# Patient Record
Sex: FEMALE | Race: WHITE | Employment: UNEMPLOYED | URBAN - METROPOLITAN AREA
[De-identification: names, ages, dates, MRNs, and addresses within clinical notes are randomized per-mention and may not be internally consistent; named-entity substitution may affect disease eponyms.]

---
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
## 2022-04-11 PROBLEM — F98.8 ADD (ATTENTION DEFICIT DISORDER) WITHOUT HYPERACTIVITY: Status: ACTIVE | Noted: 2022-04-11

## 2022-12-16 ENCOUNTER — HOSPITAL ENCOUNTER (OUTPATIENT)
Age: 22
Setting detail: SPECIMEN
Discharge: HOME | End: 2022-12-16

## 2022-12-16 DIAGNOSIS — Z00.00 WELL ADULT EXAM: ICD-10-CM

## 2022-12-17 LAB
BILIRUBIN URINE: NEGATIVE
COLOR: YELLOW
COMMENT UA: NORMAL
GLUCOSE URINE: NEGATIVE
KETONES, URINE: NEGATIVE
LEUKOCYTE ESTERASE, URINE: NEGATIVE
NITRITE, URINE: NEGATIVE
PH UA: 7.5 (ref 5–8)
PROTEIN UA: NEGATIVE
SPECIFIC GRAVITY UA: 1.01 (ref 1–1.03)
TURBIDITY: CLEAR
URINE HGB: NEGATIVE
UROBILINOGEN, URINE: NORMAL

## 2022-12-18 LAB
C. TRACHOMATIS DNA ,URINE: NEGATIVE
N. GONORRHOEAE DNA, URINE: NEGATIVE
SPECIMEN DESCRIPTION: NORMAL

## 2024-09-09 RX ORDER — ESCITALOPRAM OXALATE 5 MG/1
5 TABLET ORAL DAILY
COMMUNITY
Start: 2023-11-01

## 2024-09-16 ENCOUNTER — HOSPITAL ENCOUNTER (OUTPATIENT)
Age: 24
Setting detail: SPECIMEN
Discharge: HOME | End: 2024-09-16

## 2024-09-16 ENCOUNTER — OFFICE VISIT (OUTPATIENT)
Age: 24
End: 2024-09-16
Payer: COMMERCIAL

## 2024-09-16 VITALS
HEART RATE: 102 BPM | HEIGHT: 67 IN | OXYGEN SATURATION: 100 % | SYSTOLIC BLOOD PRESSURE: 117 MMHG | WEIGHT: 122 LBS | DIASTOLIC BLOOD PRESSURE: 70 MMHG | BODY MASS INDEX: 19.15 KG/M2

## 2024-09-16 DIAGNOSIS — N39.0 RECURRENT UTI: Primary | ICD-10-CM

## 2024-09-16 DIAGNOSIS — R31.29 OTHER MICROSCOPIC HEMATURIA: ICD-10-CM

## 2024-09-16 DIAGNOSIS — N92.6 IRREGULAR MENSES: ICD-10-CM

## 2024-09-16 DIAGNOSIS — R39.15 URGENCY OF URINATION: ICD-10-CM

## 2024-09-16 DIAGNOSIS — R35.0 URINARY FREQUENCY: ICD-10-CM

## 2024-09-16 DIAGNOSIS — K59.00 CONSTIPATION, UNSPECIFIED CONSTIPATION TYPE: ICD-10-CM

## 2024-09-16 DIAGNOSIS — R39.89 BLADDER PAIN: ICD-10-CM

## 2024-09-16 DIAGNOSIS — N94.10 DYSPAREUNIA, FEMALE: ICD-10-CM

## 2024-09-16 LAB
BILIRUBIN, POC: NEGATIVE
BLOOD URINE, POC: NEGATIVE
CLARITY, POC: CLEAR
COLOR, POC: YELLOW
GLUCOSE URINE, POC: NEGATIVE MG/DL
KETONES, POC: NEGATIVE MG/DL
LEUKOCYTE EST, POC: NEGATIVE
NITRITE, POC: NEGATIVE
PH, POC: 6
PROTEIN, POC: NEGATIVE MG/DL
SPECIFIC GRAVITY, POC: 1.01
UROBILINOGEN, POC: NEGATIVE MG/DL

## 2024-09-16 PROCEDURE — 99203 OFFICE O/P NEW LOW 30 MIN: CPT

## 2024-09-16 PROCEDURE — 81002 URINALYSIS NONAUTO W/O SCOPE: CPT

## 2024-09-17 LAB
CASE NUMBER:: NORMAL
SPECIMEN DESCRIPTION: NORMAL
SURGICAL PATHOLOGY REPORT: NORMAL

## 2024-10-16 NOTE — PROGRESS NOTES
DAY OF SURGERY/PROCEDURE  GUIDELINES    As a patient at the [facility], you can expect quality medical and nursing care that is centered on your individual needs. It is our goal to make your surgical experience as comfortable and excellent as possible.  ________________________________________________________________________    The following instructions are general guidelines, if any information on this sheet is different from what your doctor has instructed you to do, please follow your doctor's instructions.    Please arrive on 10/21 @ 700 am       Enter through entrance C. Check in at registration     Upon arrival you will be taken to the pre-operative area to get ready for surgery, your family will stay in the waiting room and visit with you once you are ready for surgery. Due to special limitations please limit visitation to 1-2 members of your family at a time. When it is time for surgery your family will return to the waiting room.    Nothing to eat, drink, smoke, suck or chew after midnight (no water, gum, mints, cigarettes, cigars, pipes, snuff, chewing tobacco, etc.) or your surgery may be canceled.     Take a shower or bath on the morning of your surgery/procedure (Hibiclens if directed) Do not apply any lotions.    Brush your teeth, but do not swallow any water    IN CASE OF ILLNESS - If you have a cold or flu symptoms (high fever, runny nose, sore throat, cough, etc.) rash, nausea, vomiting, loose stools, and/or recent contact with someone who has a contagious disease (chick pox, measles, etc.) please call your doctor before coming to the surgery center    DO NOT take anticoagulants (blood thinners, aspirin or aspirin-containing products) as instructed by your physician.    Leave all jewelry at home and wear loose, comfortable clothing that is easy to put on and take off.     If you will be returning home the same day as your surgery, you will need to have a responsible adult (18

## 2024-10-18 ENCOUNTER — ANESTHESIA EVENT (OUTPATIENT)
Dept: OPERATING ROOM | Age: 24
End: 2024-10-18
Payer: COMMERCIAL

## 2024-10-21 ENCOUNTER — ANESTHESIA (OUTPATIENT)
Dept: OPERATING ROOM | Age: 24
End: 2024-10-21
Payer: COMMERCIAL

## 2024-10-21 ENCOUNTER — HOSPITAL ENCOUNTER (OUTPATIENT)
Age: 24
Setting detail: OUTPATIENT SURGERY
Discharge: HOME | End: 2024-10-21
Attending: OBSTETRICS & GYNECOLOGY | Admitting: OBSTETRICS & GYNECOLOGY
Payer: COMMERCIAL

## 2024-10-21 VITALS
TEMPERATURE: 97.3 F | WEIGHT: 120 LBS | RESPIRATION RATE: 13 BRPM | DIASTOLIC BLOOD PRESSURE: 65 MMHG | BODY MASS INDEX: 18.19 KG/M2 | OXYGEN SATURATION: 100 % | HEIGHT: 68 IN | HEART RATE: 70 BPM | SYSTOLIC BLOOD PRESSURE: 107 MMHG

## 2024-10-21 LAB — HCG, PREGNANCY URINE (POC): NEGATIVE

## 2024-10-21 PROCEDURE — 6370000000 HC RX 637 (ALT 250 FOR IP)

## 2024-10-21 PROCEDURE — 6360000002 HC RX W HCPCS: Performed by: OBSTETRICS & GYNECOLOGY

## 2024-10-21 PROCEDURE — 2500000003 HC RX 250 WO HCPCS: Performed by: OBSTETRICS & GYNECOLOGY

## 2024-10-21 PROCEDURE — 3700000000 HC ANESTHESIA ATTENDED CARE: Performed by: OBSTETRICS & GYNECOLOGY

## 2024-10-21 PROCEDURE — 81025 URINE PREGNANCY TEST: CPT

## 2024-10-21 PROCEDURE — 7100000011 HC PHASE II RECOVERY - ADDTL 15 MIN: Performed by: OBSTETRICS & GYNECOLOGY

## 2024-10-21 PROCEDURE — 3700000001 HC ADD 15 MINUTES (ANESTHESIA): Performed by: OBSTETRICS & GYNECOLOGY

## 2024-10-21 PROCEDURE — 52260 CYSTOSCOPY AND TREATMENT: CPT | Performed by: OBSTETRICS & GYNECOLOGY

## 2024-10-21 PROCEDURE — 2500000003 HC RX 250 WO HCPCS: Performed by: SPECIALIST

## 2024-10-21 PROCEDURE — 51700 IRRIGATION OF BLADDER: CPT | Performed by: OBSTETRICS & GYNECOLOGY

## 2024-10-21 PROCEDURE — 6360000002 HC RX W HCPCS: Performed by: SPECIALIST

## 2024-10-21 PROCEDURE — 6360000002 HC RX W HCPCS

## 2024-10-21 PROCEDURE — 7100000010 HC PHASE II RECOVERY - FIRST 15 MIN: Performed by: OBSTETRICS & GYNECOLOGY

## 2024-10-21 PROCEDURE — 3600000002 HC SURGERY LEVEL 2 BASE: Performed by: OBSTETRICS & GYNECOLOGY

## 2024-10-21 PROCEDURE — 2580000003 HC RX 258: Performed by: STUDENT IN AN ORGANIZED HEALTH CARE EDUCATION/TRAINING PROGRAM

## 2024-10-21 PROCEDURE — 2709999900 HC NON-CHARGEABLE SUPPLY: Performed by: OBSTETRICS & GYNECOLOGY

## 2024-10-21 PROCEDURE — 3600000012 HC SURGERY LEVEL 2 ADDTL 15MIN: Performed by: OBSTETRICS & GYNECOLOGY

## 2024-10-21 RX ORDER — LIDOCAINE HYDROCHLORIDE 10 MG/ML
INJECTION, SOLUTION EPIDURAL; INFILTRATION; INTRACAUDAL; PERINEURAL
Status: DISCONTINUED | OUTPATIENT
Start: 2024-10-21 | End: 2024-10-21 | Stop reason: SDUPTHER

## 2024-10-21 RX ORDER — DEXAMETHASONE SODIUM PHOSPHATE 10 MG/ML
INJECTION, SOLUTION INTRAMUSCULAR; INTRAVENOUS
Status: DISCONTINUED | OUTPATIENT
Start: 2024-10-21 | End: 2024-10-21 | Stop reason: SDUPTHER

## 2024-10-21 RX ORDER — NALOXONE HYDROCHLORIDE 0.4 MG/ML
INJECTION, SOLUTION INTRAMUSCULAR; INTRAVENOUS; SUBCUTANEOUS PRN
Status: DISCONTINUED | OUTPATIENT
Start: 2024-10-21 | End: 2024-10-21 | Stop reason: HOSPADM

## 2024-10-21 RX ORDER — MORPHINE SULFATE 2 MG/ML
2 INJECTION, SOLUTION INTRAMUSCULAR; INTRAVENOUS EVERY 5 MIN PRN
Status: DISCONTINUED | OUTPATIENT
Start: 2024-10-21 | End: 2024-10-21 | Stop reason: HOSPADM

## 2024-10-21 RX ORDER — LABETALOL HYDROCHLORIDE 5 MG/ML
10 INJECTION, SOLUTION INTRAVENOUS
Status: DISCONTINUED | OUTPATIENT
Start: 2024-10-21 | End: 2024-10-21 | Stop reason: HOSPADM

## 2024-10-21 RX ORDER — ACETAMINOPHEN 500 MG
1000 TABLET ORAL EVERY 6 HOURS PRN
Qty: 120 TABLET | Refills: 0 | Status: SHIPPED | OUTPATIENT
Start: 2024-10-21 | End: 2024-11-20

## 2024-10-21 RX ORDER — SODIUM CHLORIDE 9 MG/ML
INJECTION, SOLUTION INTRAVENOUS PRN
Status: DISCONTINUED | OUTPATIENT
Start: 2024-10-21 | End: 2024-10-21 | Stop reason: HOSPADM

## 2024-10-21 RX ORDER — KETOROLAC TROMETHAMINE 30 MG/ML
INJECTION, SOLUTION INTRAMUSCULAR; INTRAVENOUS
Status: DISCONTINUED | OUTPATIENT
Start: 2024-10-21 | End: 2024-10-21 | Stop reason: SDUPTHER

## 2024-10-21 RX ORDER — ONDANSETRON 2 MG/ML
4 INJECTION INTRAMUSCULAR; INTRAVENOUS
Status: DISCONTINUED | OUTPATIENT
Start: 2024-10-21 | End: 2024-10-21 | Stop reason: HOSPADM

## 2024-10-21 RX ORDER — MIDAZOLAM HYDROCHLORIDE 2 MG/2ML
2 INJECTION, SOLUTION INTRAMUSCULAR; INTRAVENOUS
Status: DISCONTINUED | OUTPATIENT
Start: 2024-10-21 | End: 2024-10-21 | Stop reason: HOSPADM

## 2024-10-21 RX ORDER — HYDRALAZINE HYDROCHLORIDE 20 MG/ML
10 INJECTION INTRAMUSCULAR; INTRAVENOUS
Status: DISCONTINUED | OUTPATIENT
Start: 2024-10-21 | End: 2024-10-21 | Stop reason: HOSPADM

## 2024-10-21 RX ORDER — FLUCONAZOLE 150 MG/1
150 TABLET ORAL
Qty: 3 TABLET | Refills: 0 | Status: SHIPPED | OUTPATIENT
Start: 2024-10-21 | End: 2024-10-30

## 2024-10-21 RX ORDER — IPRATROPIUM BROMIDE AND ALBUTEROL SULFATE 2.5; .5 MG/3ML; MG/3ML
1 SOLUTION RESPIRATORY (INHALATION)
Status: DISCONTINUED | OUTPATIENT
Start: 2024-10-21 | End: 2024-10-21 | Stop reason: HOSPADM

## 2024-10-21 RX ORDER — SODIUM CHLORIDE, SODIUM LACTATE, POTASSIUM CHLORIDE, CALCIUM CHLORIDE 600; 310; 30; 20 MG/100ML; MG/100ML; MG/100ML; MG/100ML
INJECTION, SOLUTION INTRAVENOUS CONTINUOUS
Status: DISCONTINUED | OUTPATIENT
Start: 2024-10-21 | End: 2024-10-21 | Stop reason: HOSPADM

## 2024-10-21 RX ORDER — OXYCODONE AND ACETAMINOPHEN 5; 325 MG/1; MG/1
2 TABLET ORAL
Status: DISCONTINUED | OUTPATIENT
Start: 2024-10-21 | End: 2024-10-21 | Stop reason: HOSPADM

## 2024-10-21 RX ORDER — SODIUM CHLORIDE 0.9 % (FLUSH) 0.9 %
5-40 SYRINGE (ML) INJECTION PRN
Status: DISCONTINUED | OUTPATIENT
Start: 2024-10-21 | End: 2024-10-21 | Stop reason: HOSPADM

## 2024-10-21 RX ORDER — PROPOFOL 10 MG/ML
INJECTION, EMULSION INTRAVENOUS
Status: DISCONTINUED | OUTPATIENT
Start: 2024-10-21 | End: 2024-10-21 | Stop reason: SDUPTHER

## 2024-10-21 RX ORDER — ONDANSETRON 2 MG/ML
INJECTION INTRAMUSCULAR; INTRAVENOUS
Status: DISCONTINUED | OUTPATIENT
Start: 2024-10-21 | End: 2024-10-21 | Stop reason: SDUPTHER

## 2024-10-21 RX ORDER — LIDOCAINE HYDROCHLORIDE 10 MG/ML
INJECTION, SOLUTION INFILTRATION; PERINEURAL
Status: DISCONTINUED
Start: 2024-10-21 | End: 2024-10-21 | Stop reason: HOSPADM

## 2024-10-21 RX ORDER — FENTANYL CITRATE 50 UG/ML
INJECTION, SOLUTION INTRAMUSCULAR; INTRAVENOUS
Status: DISCONTINUED | OUTPATIENT
Start: 2024-10-21 | End: 2024-10-21 | Stop reason: SDUPTHER

## 2024-10-21 RX ORDER — GLYCOPYRROLATE 0.2 MG/ML
0.4 INJECTION INTRAMUSCULAR; INTRAVENOUS ONCE
Status: DISCONTINUED | OUTPATIENT
Start: 2024-10-21 | End: 2024-10-21 | Stop reason: HOSPADM

## 2024-10-21 RX ORDER — TRIAMCINOLONE ACETONIDE 40 MG/ML
INJECTION, SUSPENSION INTRA-ARTICULAR; INTRAMUSCULAR
Status: DISCONTINUED
Start: 2024-10-21 | End: 2024-10-21 | Stop reason: HOSPADM

## 2024-10-21 RX ORDER — CIPROFLOXACIN 500 MG/1
500 TABLET, FILM COATED ORAL 2 TIMES DAILY
Qty: 20 TABLET | Refills: 0 | Status: SHIPPED | OUTPATIENT
Start: 2024-10-21 | End: 2024-10-31

## 2024-10-21 RX ORDER — PHENAZOPYRIDINE HYDROCHLORIDE 100 MG/1
TABLET, FILM COATED ORAL
Status: COMPLETED
Start: 2024-10-21 | End: 2024-10-21

## 2024-10-21 RX ORDER — SODIUM CHLORIDE 0.9 % (FLUSH) 0.9 %
5-40 SYRINGE (ML) INJECTION EVERY 12 HOURS SCHEDULED
Status: DISCONTINUED | OUTPATIENT
Start: 2024-10-21 | End: 2024-10-21 | Stop reason: HOSPADM

## 2024-10-21 RX ORDER — PHENAZOPYRIDINE HYDROCHLORIDE 100 MG/1
200 TABLET, FILM COATED ORAL ONCE
Status: COMPLETED | OUTPATIENT
Start: 2024-10-21 | End: 2024-10-21

## 2024-10-21 RX ORDER — METOCLOPRAMIDE HYDROCHLORIDE 5 MG/ML
10 INJECTION INTRAMUSCULAR; INTRAVENOUS
Status: DISCONTINUED | OUTPATIENT
Start: 2024-10-21 | End: 2024-10-21 | Stop reason: HOSPADM

## 2024-10-21 RX ORDER — MIDAZOLAM HYDROCHLORIDE 1 MG/ML
INJECTION, SOLUTION INTRAMUSCULAR; INTRAVENOUS
Status: DISCONTINUED | OUTPATIENT
Start: 2024-10-21 | End: 2024-10-21 | Stop reason: SDUPTHER

## 2024-10-21 RX ORDER — HEPARIN SODIUM 5000 [USP'U]/ML
INJECTION, SOLUTION INTRAVENOUS; SUBCUTANEOUS
Status: DISCONTINUED
Start: 2024-10-21 | End: 2024-10-21 | Stop reason: HOSPADM

## 2024-10-21 RX ORDER — PHENAZOPYRIDINE HYDROCHLORIDE 200 MG/1
200 TABLET, FILM COATED ORAL 3 TIMES DAILY
Qty: 6 TABLET | Refills: 0 | Status: SHIPPED | OUTPATIENT
Start: 2024-10-21 | End: 2024-10-23

## 2024-10-21 RX ORDER — LIDOCAINE HYDROCHLORIDE 10 MG/ML
1 INJECTION, SOLUTION EPIDURAL; INFILTRATION; INTRACAUDAL; PERINEURAL
Status: DISCONTINUED | OUTPATIENT
Start: 2024-10-21 | End: 2024-10-21 | Stop reason: HOSPADM

## 2024-10-21 RX ORDER — OXYCODONE AND ACETAMINOPHEN 5; 325 MG/1; MG/1
1 TABLET ORAL
Status: DISCONTINUED | OUTPATIENT
Start: 2024-10-21 | End: 2024-10-21 | Stop reason: HOSPADM

## 2024-10-21 RX ORDER — MEPERIDINE HYDROCHLORIDE 50 MG/ML
12.5 INJECTION INTRAMUSCULAR; INTRAVENOUS; SUBCUTANEOUS EVERY 5 MIN PRN
Status: DISCONTINUED | OUTPATIENT
Start: 2024-10-21 | End: 2024-10-21 | Stop reason: HOSPADM

## 2024-10-21 RX ORDER — PHENYLEPHRINE HCL IN 0.9% NACL 1 MG/10 ML
SYRINGE (ML) INTRAVENOUS
Status: DISCONTINUED | OUTPATIENT
Start: 2024-10-21 | End: 2024-10-21 | Stop reason: SDUPTHER

## 2024-10-21 RX ORDER — IBUPROFEN 600 MG/1
600 TABLET, FILM COATED ORAL EVERY 6 HOURS PRN
Qty: 120 TABLET | Refills: 0 | Status: SHIPPED | OUTPATIENT
Start: 2024-10-21 | End: 2024-11-20

## 2024-10-21 RX ORDER — DIPHENHYDRAMINE HYDROCHLORIDE 50 MG/ML
12.5 INJECTION INTRAMUSCULAR; INTRAVENOUS
Status: DISCONTINUED | OUTPATIENT
Start: 2024-10-21 | End: 2024-10-21 | Stop reason: HOSPADM

## 2024-10-21 RX ADMIN — PHENAZOPYRIDINE HYDROCHLORIDE 200 MG: 100 TABLET, FILM COATED ORAL at 09:59

## 2024-10-21 RX ADMIN — MIDAZOLAM 2 MG: 1 INJECTION INTRAMUSCULAR; INTRAVENOUS at 08:23

## 2024-10-21 RX ADMIN — ONDANSETRON 4 MG: 2 INJECTION INTRAMUSCULAR; INTRAVENOUS at 08:29

## 2024-10-21 RX ADMIN — FENTANYL CITRATE 25 MCG: 50 INJECTION, SOLUTION INTRAMUSCULAR; INTRAVENOUS at 08:30

## 2024-10-21 RX ADMIN — FENTANYL CITRATE 50 MCG: 50 INJECTION, SOLUTION INTRAMUSCULAR; INTRAVENOUS at 08:26

## 2024-10-21 RX ADMIN — PROPOFOL 30 MG: 10 INJECTION, EMULSION INTRAVENOUS at 08:30

## 2024-10-21 RX ADMIN — FENTANYL CITRATE 25 MCG: 50 INJECTION, SOLUTION INTRAMUSCULAR; INTRAVENOUS at 08:37

## 2024-10-21 RX ADMIN — Medication 100 MCG: at 08:54

## 2024-10-21 RX ADMIN — PROPOFOL 50 MG: 10 INJECTION, EMULSION INTRAVENOUS at 08:42

## 2024-10-21 RX ADMIN — SODIUM CHLORIDE, PRESERVATIVE FREE 10 ML: 5 INJECTION INTRAVENOUS at 08:26

## 2024-10-21 RX ADMIN — LIDOCAINE HYDROCHLORIDE 50 MG: 10 INJECTION, SOLUTION EPIDURAL; INFILTRATION; INTRACAUDAL; PERINEURAL at 08:26

## 2024-10-21 RX ADMIN — KETOROLAC TROMETHAMINE 30 MG: 30 INJECTION, SOLUTION INTRAMUSCULAR at 08:56

## 2024-10-21 RX ADMIN — Medication 2000 MG: at 08:30

## 2024-10-21 RX ADMIN — PROPOFOL 50 MG: 10 INJECTION, EMULSION INTRAVENOUS at 08:37

## 2024-10-21 RX ADMIN — DEXAMETHASONE SODIUM PHOSPHATE 10 MG: 10 INJECTION, SOLUTION INTRAMUSCULAR; INTRAVENOUS at 08:29

## 2024-10-21 RX ADMIN — PROPOFOL 70 MG: 10 INJECTION, EMULSION INTRAVENOUS at 08:26

## 2024-10-21 RX ADMIN — PROPOFOL 100 MCG/KG/MIN: 10 INJECTION, EMULSION INTRAVENOUS at 08:43

## 2024-10-21 RX ADMIN — SODIUM CHLORIDE, PRESERVATIVE FREE 10 ML: 5 INJECTION INTRAVENOUS at 09:07

## 2024-10-21 RX ADMIN — PHENAZOPYRIDINE HYDROCHLORIDE 200 MG: 100 TABLET ORAL at 09:59

## 2024-10-21 ASSESSMENT — PAIN SCALES - GENERAL: PAINLEVEL_OUTOF10: 3

## 2024-10-21 ASSESSMENT — PAIN DESCRIPTION - DESCRIPTORS: DESCRIPTORS: BURNING

## 2024-10-21 ASSESSMENT — PAIN - FUNCTIONAL ASSESSMENT
PAIN_FUNCTIONAL_ASSESSMENT: 0-10
PAIN_FUNCTIONAL_ASSESSMENT: 0-10
PAIN_FUNCTIONAL_ASSESSMENT: NONE - DENIES PAIN

## 2024-10-21 ASSESSMENT — PAIN DESCRIPTION - LOCATION: LOCATION: VAGINA

## 2024-10-21 NOTE — DISCHARGE INSTRUCTIONS
[facility] UROGYNECOLOGY & PELVIC REHABILITATION     POSTOPERATIVE PATIENT INSTRUCTIONS  MAJOR & MINOR SURGICAL PROCEDURES      Congratulations! You have taken the brave step of undergoing surgery in order to try to improve your health.  Now it is time to focus on healing outside of the hospital / surgery center setting.  To make your dismissal as successful as possible, it is recommended that you thoroughly review these postoperative instructions. These instructions pertain to, but are not limited to the following procedures:      MAJOR   Hysterectomy - Vaginal / Laparoscopic / Robotic   With or Without tube-ovarian Removal (Salpingo-oophorectomy)   Sacrocolpopexy / Sacroperineopexy / Sacrocervicopexy/ Hysteropexy (lifting apex to sacrum)  Major Vaginal Prolapse repairs   Cystocele repair (Anterior Colporrhaphy)   Rectocele repair (Posterior Colporrhaphy)   Enterocele repair    Vaginal vault repair   Closing or removal of the vagina (Colpocleisis / Colpectomy)   Major urinary incontinence surgery (Carrera Urethropexy, MMK)   Major fibroid removal (Myomectomy)   Major tubal surgery (re-anastomosis, ectopic pregnancy, pelvic inflammatory disease)   Major surgery for adhesions or endometriosis involving bowel   Major vaginal mesh removal    Fistula repair of the bladder or colorectum to the vagina   Creation of a new vagina (Neovaginoplasty) or repair of a Mullerian Anomaly   Other       MINOR   Hysteroscopy with Dilatation / Curettage, Endometrial Ablation / Hysterosalpingogram (HSG)  Laparoscopy With or Without minor tubal or ovarian surgery   Minor Vaginal Prolapse repairs   Cystocele repair (Anterior Colporrhaphy)   Rectocele repair (Posterior Colporrhaphy)   Urethrocele repair    Minor urinary incontinence surgery (Slings, Transurethral Bulking)   Minor vaginal surgery (Mesh removal, Laser ablation, Biopsies, Labial revisions, Injections)    Minor surgery of the bladder (DMSO, Hydrodistention, Botox, etc.)

## 2024-10-21 NOTE — DISCHARGE SUMMARY
Urogynecology Discharge Summary  [facility]      Patient Name: Korin Christie  Patient : 2000  Primary Care Physician: Gabriela Higgins MD  Admit Date: 10/21/2024    Principal Diagnosis: suspected interstitial cystitis     Other Diagnosis:   Bladder pain [R39.89]  Patient Active Problem List   Diagnosis    Asthma    Delta storage pool disease (HCC)    Dysmenorrhea    Cellulitis of ankle    COVID-19    History of 2019 novel coronavirus disease (COVID-19)    ADD (attention deficit disorder) without hyperactivity     Infection: No  Hospital Acquired: No    Surgical Operations & Procedures: Cystoscopy with DMSO    Consultations: Anesthesia    Pertinent Findings & Procedures:   Korin Christie is a 24 y.o. female admitted for surgical evaluation of suspected interstitial cystitis; received Ancef preop.  She underwent Cystoscopy with DMSO on 10/21/24. She was discharged home without a fallon catheter. Post-op course normal, discharged home on POD#0.  Follow up in 1 week. Discharge instructions reviewed and questions answered.    Course of patient: normal    Discharge to: Home    Readmission planned: No    Recommendations on Discharge:     Medications:     Medication List        START taking these medications      acetaminophen 500 MG tablet  Commonly known as: TYLENOL  Take 2 tablets by mouth every 6 hours as needed for Pain     ciprofloxacin 500 MG tablet  Commonly known as: CIPRO  Take 1 tablet by mouth 2 times daily for 10 days     fluconazole 150 MG tablet  Commonly known as: DIFLUCAN  Take 1 tablet by mouth every 72 hours for 9 days     ibuprofen 600 MG tablet  Commonly known as: ADVIL;MOTRIN  Take 1 tablet by mouth every 6 hours as needed for Pain     phenazopyridine 200 MG tablet  Commonly known as: Pyridium  Take 1 tablet by mouth 3 times daily for 2 days            CONTINUE taking these medications      Aczone 7.5 % Gel topical gel  Generic drug: dapsone     Adapalene 0.3 %

## 2024-10-21 NOTE — ANESTHESIA POSTPROCEDURE EVALUATION
Department of Anesthesiology  Postprocedure Note    Patient: Korin Christie  MRN: [id number]  YOB: 2000  Date of evaluation: 10/21/2024    Procedure Summary       Date: 10/21/24 Room / Location: [address]    Anesthesia Start: 0823 Anesthesia Stop: 0916    Procedure: CYSTOSCOPY HYDRODISTENTION DMSO Diagnosis:       Bladder pain      (Bladder pain [R39.89])    Surgeons: Karthik Lewis DO Responsible Provider: Ernesto Hopkins MD    Anesthesia Type: general ASA Status: 1            Anesthesia Type: No value filed.    Christie Phase I: Christie Score: 8    Christie Phase II:      Anesthesia Post Evaluation    Patient location during evaluation: PACU  Patient participation: complete - patient participated  Level of consciousness: awake and alert  Airway patency: patent  Nausea & Vomiting: no nausea and no vomiting  Cardiovascular status: hemodynamically stable  Respiratory status: room air and spontaneous ventilation  Hydration status: euvolemic  Multimodal analgesia pain management approach  Pain management: adequate    No notable events documented.

## 2024-10-21 NOTE — H&P
patient and her family, if present.   - Consent signed, on chart.   - The patient is ready for transport to the operative suite.    Counseling:  The patient was counseled on all options both medical and surgical, conservative as well as definitive. She has elected to proceed with the procedure as stated above.     The patient was counseled on the procedure. Risks and complications were reviewed in detail. The patients orders, labs, consents have been completed. The history and physical as well as all supporting surgical documentation will be forwarded to the pre-operative holding area.     The patient is aware that this procedure may not alleviate her symptoms. That there may be a necessity for a second surgery and that there may be an incomplete removal of abnormal tissue.    Tushar Kang MD  UroGyn Resident   [facility]  10/21/2024, 7:10 AM    The patient has symptoms, physical findings, and diagnostic testing that have an appropriate indication for today's planned procedure. The patient has been cleared by appropriate preoperative protocols and there are no contraindications to proceeding with the planned procedure today. The patient has been seen and examined by myself in preparation for her planned surgical procedure today. The patient's vitals are stable, and there are no complaints of chest pain, shortness of breath, calf pain, or any new open sores on the body. An informed consent has been signed under no duress or confusion and was reviewed one final time.  All questions were answered to the patient's satisfaction.      The patient confirmed they read written information and instructions prior to their surgery as part of their responsibility in understanding their surgery and recuperation: Yes    The patient confirmed they watched video information and instructions prior to their surgery as part of their responsibility in understanding their surgery and recuperation: Yes          [facility]

## 2024-10-21 NOTE — ANESTHESIA PRE PROCEDURE
Department of Anesthesiology  Preprocedure Note       Name:  Korin Christie   Age:  24 y.o.  :  2000                                          MRN:  [id number]         Date:  10/21/2024      Surgeon: Surgeon(s):  Karthik Lewis DO    Procedure: Procedure(s):  CYSTOSCOPY HYDRODISTENTION DMSO    Medications prior to admission:   Prior to Admission medications    Medication Sig Start Date End Date Taking? Authorizing Provider   escitalopram (LEXAPRO) 5 MG tablet Take 1 tablet by mouth daily 23  Yes ProviderJarred MD   lisdexamfetamine (VYVANSE) 50 MG capsule Take 1 capsule by mouth daily for 90 days. Max Daily Amount: 50 mg 3/24/23 9/16/24 Yes Britt Beltre APRN - CNP   Adapalene 0.3 % GEL APPLY TOPICAL EVERY DAY AT BEDTIME 22  Yes Provider, MD Jarred   clindamycin (CLEOCIN T) 1 % lotion APPLY TO THE AFFECTED AREA EVERY MORNING 22  Yes ProviderJarred MD   ACZONE 7.5 % GEL topical gel APPLY TOPICALLY EVERY MORNING 22  Yes Provider, MD Jarred   fluticasone (FLONASE) 50 MCG/ACT nasal spray 1 spray by Nasal route daily Have patient plug one nostril when instilling spray in other nare. Spray towards outside of nare to prevent medication from going down the back of the throat. 10/21/22  Yes Britt Beltre APRN - CNP   polyethylene glycol (MIRALAX) 17 GM/SCOOP powder Follow cleanout and maintenance protocol as directed in office visit at PCP 3/23/22  Yes Cat Kelly MD       Current medications:    Current Facility-Administered Medications   Medication Dose Route Frequency Provider Last Rate Last Admin   • lidocaine PF 1 % injection 1 mL  1 mL IntraDERmal Once PRN Carmen Chakraborty MD       • lactated ringers IV soln infusion SOLN   IntraVENous Continuous Carmen Chakraborty MD       • sodium chloride flush 0.9 % injection 5-40 mL  5-40 mL IntraVENous 2 times per day Carmen Chakraborty MD       • sodium chloride flush 0.9 % injection 5-40 mL  5-40 mL IntraVENous PRN Palmer

## 2024-10-21 NOTE — BRIEF OP NOTE
Brief Operative Note  Department of Obstetrics and Gynecology  [facility]     Patient: Korin Christie   : 2000  MRN: [id number]       Acct: [id number]   Date of Procedure: 10/20/24     Pre-operative Diagnosis: 24 y.o.   Suspected interstitial cystitis   Recurrent UTI  Dyspareunia   BMI 18    Post-operative Diagnosis: same as above  Interstitial cystitis   Recurrent UTI  Dyspareunia   BMI 18    Procedure: Cystoscopy with DMSO    Surgeon: Dr. Lewis     Assistant(s): Tushar Kang MD, PGY4    Anesthesia: MAC    Findings:  Normal appearance of external genitalia. Grossly normal appearance of urethra. Prior to hydro-distension, inflamed and hypervascular bladder trigone noted. Worsened inflamed and hypervascular appearance of the majority of the bladder noted with hydro-distension. Bilateral ureteral efflux noted. After hydrodistention, microhemorrhages noted.  Total IV fluids/Blood products:  20 ml crystalloid  Urine Output:  voided prior to surgery   Estimated blood loss:  5mL  Drains:  none  Specimens:  none  Instrument and Sponge Count: Correct  Complications:  none  Condition:  good, transferred to post anesthesia recovery    Tushar Kang MD  Ob/Gyn Resident  10/21/2024, 9:19 AM

## 2024-10-21 NOTE — OP NOTE
Operative Note      Patient: Korin Christie  YOB: 2000  MRN: [id number]    Date of Procedure: 10/21/2024    Pre-Op Diagnosis Codes:      * Bladder pain [R39.89]    Post-Op Diagnosis: Same + Interstitial Cystitis with Trigonitis       Procedure(s):  CYSTOSCOPY HYDRODISTENTION DMSO    Surgeon(s):  Karthik Lewis DO    Assistant:   Resident: Tushar Kang MD    Anesthesia: General    Estimated Blood Loss (mL): Minimal    Complications: None    Specimens:   * No specimens in log *    Implants:  * No implants in log *      Drains: * No LDAs found *    Findings:  Infection Present At Time Of Surgery (PATOS) (choose all levels that have infection present):  No infection present  Other Findings: none    Detailed Description of Procedure:   The indication for cystoscopy with hydrodistention and DMSO instillation is suspected interstitial cystitis with bladder pain, urgency and frequency based on symptomatology and clinical / laboratory findings.     The patient is being admitted for a planned elective surgical procedure today. The patient has symptoms, physical findings, and diagnostic testing meeting appropriate indications for today's planned procedure. The patient has been educated about their condition, conservative and surgical options have been offered to the patient, and the patient has decided to proceed with a surgical option. In the preoperative holding area prior to the procedure, the operative plan, including risks, benefits and alternatives was reviewed with the patient and any present family member and friend.  An informed consent has been signed under no duress or confusion. The patient has been surgically cleared by her PCP and /or any pertinent specialists as recommended. All pertinent preoperative labs and testing have been reviewed. A pregnancy test was confirmed as negative if of reproductive age with an intact uterus. The patient has satisfactorily completed recommended pre-operative

## 2024-10-28 ENCOUNTER — OFFICE VISIT (OUTPATIENT)
Age: 24
End: 2024-10-28

## 2024-10-28 VITALS
WEIGHT: 121 LBS | SYSTOLIC BLOOD PRESSURE: 126 MMHG | BODY MASS INDEX: 18.4 KG/M2 | HEART RATE: 128 BPM | OXYGEN SATURATION: 97 % | DIASTOLIC BLOOD PRESSURE: 82 MMHG

## 2024-10-28 DIAGNOSIS — N30.30 TRIGONITIS: ICD-10-CM

## 2024-10-28 DIAGNOSIS — N39.0 RECURRENT UTI: ICD-10-CM

## 2024-10-28 DIAGNOSIS — N32.89 BLADDER SPASMS: ICD-10-CM

## 2024-10-28 DIAGNOSIS — N30.10 CHRONIC INTERSTITIAL CYSTITIS: Primary | ICD-10-CM

## 2024-10-28 DIAGNOSIS — R39.15 URGENCY OF URINATION: ICD-10-CM

## 2024-10-28 PROCEDURE — 99024 POSTOP FOLLOW-UP VISIT: CPT | Performed by: NURSE PRACTITIONER

## 2024-10-28 NOTE — PROGRESS NOTES
retention, urgency-frequency, painful voiding, uti symptoms, or gross hematuria.    Avoid constipation.  The patient understands this may be avoided by increasing activity and maintaining a high fiber diet (unless otherwise instructed by the practitioner).  The more activity the greater the probability bowels will work properly.  Increase fluid intake, preferably water 64-96 ounces a day.  Use a vegetable non-stimulant stool softener when necessary and try to avoid long-term laxatives.    The patient may resume intercourse at the 2 week sigifredo for outpatient surgeries, 6 week sigifredo for larger reconstructive surgeries, and 8 weeks for robotic surgeries.  If postoperative vaginal swelling, pain, or bleeding persists patient may wish to delay intercourse 2-4 weeks.  Just as a hip replacement requires stretching and therapeutic use, so does the operated vagina.  The patient understands that there are nonsurgical options to help her should she have painful intercourse such as lubrication and dilator therapy.     It is okay to resume light exercise like walking right away.  For patients with prolapse repairs it is recommended not to exercise heavily or lift objects heavier than 15-20 pounds until her final exam.  This may seem unrealistic.  Try to put off lifting as long as possible for at this time the patient will continue to heal for the next 6-12 months.  If lifting is unavoidable, the patient is instructed not to hold their breath but blow out as they lift to decrease abdominal and pelvic pressure.  The patient is aware if they choose to lift objects heavier than recommended or engage in unhealthy pelvic activity they may increase the risk of prolapse recurrence. An unhealthy return to smoking or poorly controlled diabetes may also hasten surgical failure. The patient understands that it is up to her to have common sense in regard to her daily activities.  More strain on her surgical site may lead to suboptimal

## 2024-10-28 NOTE — PATIENT INSTRUCTIONS
Zyrtec 10mg daily  IC diet  In office DMSO instillations if needed in future for bladder symptoms  Prelief  Seek care and get urine culture with any future UTI symtoms  Voiding before and after intercourse  Physical therapy referral if desired will be ordered

## 2025-03-10 ENCOUNTER — OFFICE VISIT (OUTPATIENT)
Age: 25
End: 2025-03-10
Payer: COMMERCIAL

## 2025-03-10 DIAGNOSIS — R39.15 URGENCY OF URINATION: ICD-10-CM

## 2025-03-10 DIAGNOSIS — R10.2 PELVIC PAIN: ICD-10-CM

## 2025-03-10 DIAGNOSIS — R10.84 GENERALIZED ABDOMINAL PAIN: ICD-10-CM

## 2025-03-10 DIAGNOSIS — M62.838 MUSCLE SPASM: ICD-10-CM

## 2025-03-10 DIAGNOSIS — K59.00 CONSTIPATION, UNSPECIFIED CONSTIPATION TYPE: ICD-10-CM

## 2025-03-10 DIAGNOSIS — N94.10 DYSPAREUNIA, FEMALE: ICD-10-CM

## 2025-03-10 DIAGNOSIS — R35.0 URINARY FREQUENCY: Primary | ICD-10-CM

## 2025-03-10 PROCEDURE — 97161 PT EVAL LOW COMPLEX 20 MIN: CPT | Performed by: PHYSICAL THERAPIST

## 2025-03-10 PROCEDURE — 97140 MANUAL THERAPY 1/> REGIONS: CPT | Performed by: PHYSICAL THERAPIST

## 2025-03-10 PROCEDURE — 97530 THERAPEUTIC ACTIVITIES: CPT | Performed by: PHYSICAL THERAPIST

## 2025-03-10 NOTE — PROGRESS NOTES
Physical Therapy Evaluation  [facility], [facility] UROGYNECOLOGY AND PELVIC REHABILITATION   [address] [place] [address]  [address] [place] [address]  Dept: [phone]     Patient:  Korin Christie  : 2000    Visit Date:  3/10/2025   Referring Provider:  MODESTA Kruger*   Time In: 08:05  Time Out: 09:00  Total time: 55 min      Treatment:  Treatment Charges Mins Units   [x] Manual Therapy (51642) 15 1   [x] Therapeutic Activity (77131) 25 2   []Neuromuscular Bandar (41340)     [x] PT-Eval (93164, 77039, 14876) 15 1   [] Caregiver Training (10594)     [] Gait Training (67002)     []      [] PT Re-Eval (48567)     []      Total Treatment Time: 55 4      Diagnoses:    Diagnosis Orders   1. Urinary frequency        2. Dyspareunia, female        3. Constipation, unspecified constipation type        4. Urgency of urination        5. Muscle spasm        6. Generalized abdominal pain        7. Pelvic pain           Visit Diagnoses         Codes      Urinary frequency    -  Primary R35.0      Dyspareunia, female     N94.10      Constipation, unspecified constipation type     K59.00      Urgency of urination     R39.15      Muscle spasm     M62.838      Generalized abdominal pain     R10.84      Pelvic pain     R10.2           Precautions:  History of UTIs since puberty, last UTI with passing large clot last fall, \"hard\" periods, BCP since a young age    Korin Christie, 25 y.o., female presents today for PT evaluation of B lower abdominal pain, vaginal pain and tightness, decreased awareness of urinary filling      Pt's concerns are for vaginal tightness/pain, decreased urge awareness      Korin Christie states she drinks less than 8 oz of red bull a day, Gatorade, very little amount of water.   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    Past Medical and Surgical History of relevance:   Past Surgical History:   Procedure Laterality

## 2025-03-17 ENCOUNTER — EVALUATION (OUTPATIENT)
Age: 25
End: 2025-03-17
Payer: COMMERCIAL

## 2025-03-17 DIAGNOSIS — K59.00 CONSTIPATION, UNSPECIFIED CONSTIPATION TYPE: ICD-10-CM

## 2025-03-17 DIAGNOSIS — R35.0 URINARY FREQUENCY: Primary | ICD-10-CM

## 2025-03-17 DIAGNOSIS — N94.10 DYSPAREUNIA, FEMALE: ICD-10-CM

## 2025-03-17 DIAGNOSIS — R10.2 PELVIC PAIN: ICD-10-CM

## 2025-03-17 DIAGNOSIS — R39.15 URGENCY OF URINATION: ICD-10-CM

## 2025-03-17 DIAGNOSIS — M62.838 MUSCLE SPASM: ICD-10-CM

## 2025-03-17 DIAGNOSIS — R10.84 GENERALIZED ABDOMINAL PAIN: ICD-10-CM

## 2025-03-17 PROCEDURE — 97140 MANUAL THERAPY 1/> REGIONS: CPT | Performed by: PHYSICAL THERAPIST

## 2025-03-17 PROCEDURE — 97530 THERAPEUTIC ACTIVITIES: CPT | Performed by: PHYSICAL THERAPIST

## 2025-03-17 NOTE — PROGRESS NOTES
If you are achy and you can take tylenol, motrin or ibuprofen if needed we are unable to prescribed it but will not interfere with treatment.  For females: you may see some spotting especially if you are close or at the end of your cycle. This is normal. Drink more water.       Treatment:  Manual Therapy:  30 MIN Internal MFR, MFR/Cupping to bilateral LB/buttocks region   There-Act:  23 MIN  Reviewed POC and HEP.  Pt aware of after care and possible side effects of Cupping, educated on dilators and which ones to purchase    Assessment:  Pt had moderate tightness noted 1-3rd layers internally  decreased after internal MFR.  Tightness and tenderness noted across bilateral psis, piriformis, quadratus, paraspinal region decreased after MFR/cupping. Pt is aware of the after care and possible side effects of cupping as well.  Pt will continue to benefit from skilled PT to increase mobility and decrease pain        GOALS: 6 visits  PROGRESSING     Pt to decrease guarding/tightness 80% with cupping/soft tissue/dilator use to allow for decrease pain with intercourse.   Pt to increase water/propel intake to around 64 oz a day to encourage a full bladder to improve sensation of the pelvic floor.   Pt to be independent with self stretching to allow for a decrease in guarding to posture to allow for a decrease in pain throughout her day.         PLAN- BFB next visit, cupping, soft tissue, potential dilator use cont hip openers        Patient's goal is decreased pain with intercourse and decrease feeling of fullness.       Treatment Charges: Mins Units   []  Modalities     []  Ther Exercise     [x]  Manual Therapy 30 2   [x]  Ther Activities 23 2   []  Aquatics     []  Vasocompression     []  Other     Total Treatment time 53 4         Time In:11:00am            Time Out: 12:00pm    Electronically signed by Cyndi Quinones PTA on 3/17/2025 at 7:27 AM

## 2025-03-24 ENCOUNTER — EVALUATION (OUTPATIENT)
Age: 25
End: 2025-03-24

## 2025-03-24 DIAGNOSIS — R39.15 URGENCY OF URINATION: ICD-10-CM

## 2025-03-24 DIAGNOSIS — M62.838 MUSCLE SPASM: ICD-10-CM

## 2025-03-24 DIAGNOSIS — K59.00 CONSTIPATION, UNSPECIFIED CONSTIPATION TYPE: ICD-10-CM

## 2025-03-24 DIAGNOSIS — R35.0 URINARY FREQUENCY: Primary | ICD-10-CM

## 2025-03-24 DIAGNOSIS — N94.10 DYSPAREUNIA, FEMALE: ICD-10-CM

## 2025-03-24 DIAGNOSIS — R10.2 PELVIC PAIN: ICD-10-CM

## 2025-03-24 DIAGNOSIS — R10.84 GENERALIZED ABDOMINAL PAIN: ICD-10-CM

## 2025-03-24 NOTE — PROGRESS NOTES
Physical Therapy Treatment Note   [facility], [facility] UROGYNECOLOGY AND PELVIC REHABILITATION   [address] [place] [address]  [address] [place] [address]  Dept: [phone]     Visit # 3    Patient: Korin Christie  : 2000   DOS: 3/24/2025  Referring Physician:  MODESTA Kruger*     Time In: 08:00  Time Out: 08:49  Total Time (min): 49    Treatment:  Treatment Charges Mins Units   [x] Manual Therapy (33630) 19 1   [] Therapeutic Activity (65428)     [] Self Care/Home Management Training (93623)     [x] Therapeutic Exercise (27311) 15 1   [x] Neuromuscular Bandar (99479) 15 1   [] Gait Training (85467)     [] PT-Eval (54062, 60768, 55248)     [] PT Re-Eval (32644)     [] Caregiver Training (42246)     Total Treatment Time: 49 3     Subjective:  Pt's primary c/o:   Chief Complaint   Patient presents with    Pelvic Pain        Diagnosis:   Diagnosis Orders   1. Urinary frequency        2. Urgency of urination        3. Pelvic pain        4. Dyspareunia, female        5. Muscle spasm        6. Constipation, unspecified constipation type        7. Generalized abdominal pain           Visit Diagnoses         Codes      Urinary frequency    -  Primary R35.0      Urgency of urination     R39.15      Pelvic pain     R10.2      Dyspareunia, female     N94.10      Muscle spasm     M62.838      Constipation, unspecified constipation type     K59.00      Generalized abdominal pain     R10.84           Pt reports:    Pt reports she was unable to tell if cupping had any effect after last session due to pt falling down at home. Pt states she was not injured just being clumsy, but was unsure if cupping was helpful due to being sore from falling.     Pt was diagnosed with IC. Pt will get medical management for IC.     Pt has not had the opportunity to get dilators. Pt has the information for what type of dilators to get.     Objective:    Manual Therapy:  Cupping to

## 2025-03-31 ENCOUNTER — EVALUATION (OUTPATIENT)
Age: 25
End: 2025-03-31
Payer: COMMERCIAL

## 2025-03-31 DIAGNOSIS — M62.838 MUSCLE SPASM: ICD-10-CM

## 2025-03-31 DIAGNOSIS — K59.00 CONSTIPATION, UNSPECIFIED CONSTIPATION TYPE: ICD-10-CM

## 2025-03-31 DIAGNOSIS — R10.2 PELVIC PAIN: ICD-10-CM

## 2025-03-31 DIAGNOSIS — N94.10 DYSPAREUNIA, FEMALE: ICD-10-CM

## 2025-03-31 DIAGNOSIS — R35.0 URINARY FREQUENCY: ICD-10-CM

## 2025-03-31 DIAGNOSIS — R39.15 URGENCY OF URINATION: ICD-10-CM

## 2025-03-31 DIAGNOSIS — R10.84 GENERALIZED ABDOMINAL PAIN: Primary | ICD-10-CM

## 2025-03-31 PROCEDURE — 97530 THERAPEUTIC ACTIVITIES: CPT | Performed by: PHYSICAL THERAPIST

## 2025-03-31 PROCEDURE — 97140 MANUAL THERAPY 1/> REGIONS: CPT | Performed by: PHYSICAL THERAPIST

## 2025-03-31 NOTE — PROGRESS NOTES
Physical Therapy Treatment Note   [facility], [facility] UROGYNECOLOGY AND PELVIC REHABILITATION  [address]  [address]  [facility] [address]  Dept: [phone]            Physical Therapy Daily Treatment Note    Date:  3/31/2025  Patient Name:  Korin Christie    :  2000  MRN: [id number]  Referring Provider :      Diagnosis:     ICD-10-CM    1. Generalized abdominal pain  R10.84       2. Muscle spasm  M62.838       3. Pelvic pain  R10.2       4. Dyspareunia, female  N94.10       5. Urinary frequency  R35.0       6. Urgency of urination  R39.15       7. Constipation, unspecified constipation type  K59.00             Visit#:  4       Subjective:      Today, Korin reports she was sore for about 1-2 days following cupping last session. Pt reported that she does feel like the cupping was beneficial overall. Pt has not had the opportunity to change Gatorade to other water options yet.     Pt ordered dilators they are not in yet and is planning on bringing them next session.     Plan for today's session was verbally explained to patient along with clinical rationale. Korin  verbalized agreement with today's plan prior to the initiation of treatment.    Objective:    Manual therapy:    Cupping to lower abdomen with good patient tolerance.     Discussed aftercare of cupping with patient.   ______________________________________________________________________  HEP/Therex    Halo Menstrual Pod (amazon)    Hint water, sugar free liquid IV, water boy     [place] (water filter), [place] (free sip water bottle)    Body pillow (for now put 2 pillows under knee to ankle)      ______________________________________________________________________      Treatment Charges: Mins Units   []  NMR     []  Ther Exercise     [x]  Manual Therapy (cupping) 15 1   [x]  Ther Activities (education on menstruation help, bladder habits) 45 3   []  Self Care      Total Treatment time

## 2025-03-31 NOTE — PATIENT INSTRUCTIONS
Halo Menstrual Pod (amazon)    Hint water, sugar free liquid IV, water boy     [place] (water filter), [place] (free sip water bottle)    Body pillow (for now put 2 pillows under knee to ankle)

## 2025-04-04 NOTE — PROGRESS NOTES
[facility] PHYSICIANS [facility] UROGYNECOLOGY AND PELVIC REHABILITATION  [address]  [address]  [facility] [place] [address]  Dept: [phone]     Date of Visit: 2025   Patient: Korin Christie   : 2000   Referring Physician:  MODESTA Kruger*    Insurance: [facility]    Visit#:  5   Visit Diagnoses:       Visit Diagnoses         Codes      Generalized abdominal pain    -  Primary R10.84      Muscle spasm     M62.838      Pelvic pain     R10.2      Dyspareunia, female     N94.10      Urinary frequency     R35.0      Urgency of urination     R39.15      Constipation, unspecified constipation type     K59.00                Subjective:  Korin Christie  (: 2000  is a 25 y.o.  y.o. female ,. Pt states she has not received her dilators yet but shows they are on there way.  Pt was on her cycle last week and still lingering some today.  Pt will bring dilators in next visit.  Pt state sshe is voiding every 2-3 hours and not getting up at night at all.  Pt states she has been sleeping on her belly with a pillow and pain is not as bad when she gets up in the am but is still present.  Pt is no longer drinking gatorade and now drinking water with her cirkle water bottle.  Pt IC has not been flared but has found garlic to be a flare.      Objective:   Significant tightness and tenderness noted across bilateral psis, piriformis, quadratus region     Tightness and tenderness noted at bilateral upper and lower abdomen     Educated on after care of cupping    Aftercare instructions:  any of these symptoms may last for 24-48 hours after treatment  No hot pack or cold pack for 6 hours anywhere on the body   If you are light headed and/or nauseas or get a headache. Drink more water or electrolyte solution (ex water diluted Gatorade, LMNT, liquid IV) as we drain everything into your lymphatic system which can caused increased frequency with urination or increased

## 2025-04-07 ENCOUNTER — EVALUATION (OUTPATIENT)
Age: 25
End: 2025-04-07
Payer: COMMERCIAL

## 2025-04-07 DIAGNOSIS — R35.0 URINARY FREQUENCY: ICD-10-CM

## 2025-04-07 DIAGNOSIS — R39.15 URGENCY OF URINATION: ICD-10-CM

## 2025-04-07 DIAGNOSIS — M62.838 MUSCLE SPASM: ICD-10-CM

## 2025-04-07 DIAGNOSIS — K59.00 CONSTIPATION, UNSPECIFIED CONSTIPATION TYPE: ICD-10-CM

## 2025-04-07 DIAGNOSIS — R10.84 GENERALIZED ABDOMINAL PAIN: Primary | ICD-10-CM

## 2025-04-07 DIAGNOSIS — N94.10 DYSPAREUNIA, FEMALE: ICD-10-CM

## 2025-04-07 DIAGNOSIS — R10.2 PELVIC PAIN: ICD-10-CM

## 2025-04-07 PROCEDURE — 97530 THERAPEUTIC ACTIVITIES: CPT

## 2025-04-07 PROCEDURE — 97140 MANUAL THERAPY 1/> REGIONS: CPT

## 2025-04-15 ENCOUNTER — EVALUATION (OUTPATIENT)
Age: 25
End: 2025-04-15
Payer: COMMERCIAL

## 2025-04-15 DIAGNOSIS — R10.84 GENERALIZED ABDOMINAL PAIN: Primary | ICD-10-CM

## 2025-04-15 DIAGNOSIS — R35.0 URINARY FREQUENCY: ICD-10-CM

## 2025-04-15 DIAGNOSIS — R39.15 URGENCY OF URINATION: ICD-10-CM

## 2025-04-15 DIAGNOSIS — R10.2 PELVIC PAIN: ICD-10-CM

## 2025-04-15 DIAGNOSIS — N94.10 DYSPAREUNIA, FEMALE: ICD-10-CM

## 2025-04-15 DIAGNOSIS — M62.838 MUSCLE SPASM: ICD-10-CM

## 2025-04-15 DIAGNOSIS — K59.00 CONSTIPATION, UNSPECIFIED CONSTIPATION TYPE: ICD-10-CM

## 2025-04-15 PROCEDURE — 97140 MANUAL THERAPY 1/> REGIONS: CPT | Performed by: PHYSICAL THERAPIST

## 2025-04-15 PROCEDURE — 97530 THERAPEUTIC ACTIVITIES: CPT | Performed by: PHYSICAL THERAPIST

## 2025-04-15 PROCEDURE — 97110 THERAPEUTIC EXERCISES: CPT | Performed by: PHYSICAL THERAPIST

## 2025-04-15 NOTE — PROGRESS NOTES
Physical Therapy Daily Treatment Note    Date:  4/15/2025  Patient Name:  Korin Christie    :  2000  MRN: [id number]  Referring Provider :  Laura Pineda APRN - CNP    Diagnosis:   Visit Diagnoses         Codes      Generalized abdominal pain    -  Primary R10.84      Muscle spasm     M62.838      Pelvic pain     R10.2      Urgency of urination     R39.15      Urinary frequency     R35.0      Dyspareunia, female     N94.10      Constipation, unspecified constipation type     K59.00             ICD-10-CM    1. Generalized abdominal pain  R10.84       2. Muscle spasm  M62.838       3. Pelvic pain  R10.2       4. Urgency of urination  R39.15       5. Urinary frequency  R35.0       6. Dyspareunia, female  N94.10       7. Constipation, unspecified constipation type  K59.00             Visit#: 6      Subjective:      Today, Korin reports her bladder has been feeling less \"irritated\" from the switch of Gatorade to the Cirkle water bottle. Pt states she does feel like she is more flared in the morning and at night with lower abdominal pain. Pt is still sleeping with pillow under belly and says she feel like she almost has to now.     Pt has dilators this visit and plan to go over use for at home today.     Plan for today's session was verbally explained to patient along with clinical rationale. Korin  verbalized agreement with today's plan prior to the initiation of treatment.    Objective:    PFM muscle tension: Pt has not vaginal opening tightness, increased tightness noted at posterior fourchette.     ______________________________________________________________________  HEP/Therex    Dilator use for home handout: (Starting with smallest dilator)   Start with just deep belly breathing with every step    Left side, with small stretch    Right side, with small stretch    Stretch in a \"sweep\" making a U to introduce posterior    Then, posterior, small stretch    10-15 min, 2x/week   -After body is able to

## 2025-04-21 ENCOUNTER — EVALUATION (OUTPATIENT)
Age: 25
End: 2025-04-21
Payer: COMMERCIAL

## 2025-04-21 DIAGNOSIS — R39.15 URGENCY OF URINATION: ICD-10-CM

## 2025-04-21 DIAGNOSIS — R10.84 GENERALIZED ABDOMINAL PAIN: Primary | ICD-10-CM

## 2025-04-21 DIAGNOSIS — M62.838 MUSCLE SPASM: ICD-10-CM

## 2025-04-21 DIAGNOSIS — K59.00 CONSTIPATION, UNSPECIFIED CONSTIPATION TYPE: ICD-10-CM

## 2025-04-21 DIAGNOSIS — R35.0 URINARY FREQUENCY: ICD-10-CM

## 2025-04-21 DIAGNOSIS — N94.10 DYSPAREUNIA, FEMALE: ICD-10-CM

## 2025-04-21 DIAGNOSIS — R10.2 PELVIC PAIN: ICD-10-CM

## 2025-04-21 PROCEDURE — 97530 THERAPEUTIC ACTIVITIES: CPT

## 2025-04-21 PROCEDURE — 97140 MANUAL THERAPY 1/> REGIONS: CPT

## 2025-04-21 NOTE — PROGRESS NOTES
[facility] PHYSICIANS [facility], [facility] UROGYNECOLOGY AND PELVIC REHABILITATION  [address]  [address]  [facility] [place] [address]  Dept: [phone]     Date of Visit: 2025   Patient: Korin Christie   : 2000   Referring Physician:  MODESTA Kruger*    Insurance: [facility]    Visit#:  7   Visit Diagnoses:         Visit Diagnoses         Codes      Generalized abdominal pain    -  Primary R10.84      Muscle spasm     M62.838      Pelvic pain     R10.2      Urgency of urination     R39.15      Urinary frequency     R35.0      Dyspareunia, female     N94.10      Constipation, unspecified constipation type     K59.00                  Subjective:  Korin Chritsie  (: 2000  is a 25 y.o.  y.o. female ,.  Pt state she is voiding every 2-3 hours and not getting up at night at all.   Pt states she has used the dilator 2 times and seems to be going OK.  Forgot to do in a reclined position but will from here on out.          Objective:   Significant tightness and tenderness noted across upper and lower abdomen  Internal MFR  Educated on after care of cupping    Aftercare instructions:  any of these symptoms may last for 24-48 hours after treatment  No hot pack or cold pack for 6 hours anywhere on the body   If you are light headed and/or nauseas or get a headache. Drink more water or electrolyte solution (ex water diluted Gatorade, LMNT, liquid IV) as we drain everything into your lymphatic system which can caused increased frequency with urination or increased sweating. It is not uncommon to see people significantly increase fluid intake and we recommend meeting your bodies demands. This is a NORMAL response.   If you are achy and you can take tylenol, motrin or ibuprofen if needed we are unable to prescribed it but will not interfere with treatment.  For females: you may see some spotting especially if you are close or at the end of your cycle. This is normal.

## 2025-04-25 NOTE — PROGRESS NOTES
[facility] PHYSICIANS [facility] UROGYNECOLOGY AND PELVIC REHABILITATION  [address]  [address]  [facility] [place] [address]  Dept: [phone]     Date of Visit: 2025   Patient: Korin Christie   : 2000   Referring Physician:  MODESTA Kruger*    Insurance: [facility]    Visit#:  8  Visit Diagnoses:         Visit Diagnoses         Codes      Generalized abdominal pain    -  Primary R10.84      Muscle spasm     M62.838      Pelvic pain     R10.2      Urgency of urination     R39.15      Urinary frequency     R35.0      Dyspareunia, female     N94.10      Constipation, unspecified constipation type     K59.00                        Subjective:  Korin Christie  (: 2000  is a 25 y.o.  y.o. female ,.  Pt state she is voiding every 2-3 hours and not getting up at night at all.   Pt was on her cycle last week  still on it today as well and did not use the dilators.  Pt states she does not have libido at all and did when she was on BC will look into hormone blood panel to see what they can change pt is getting  in July      Objective:   Significant tightness and tenderness noted across upper and lower abdomen and bilateral LB/buttocks region   Educated on after care of cupping    Aftercare instructions:  any of these symptoms may last for 24-48 hours after treatment  No hot pack or cold pack for 6 hours anywhere on the body   If you are light headed and/or nauseas or get a headache. Drink more water or electrolyte solution (ex water diluted Gatorade, LMNT, liquid IV) as we drain everything into your lymphatic system which can caused increased frequency with urination or increased sweating. It is not uncommon to see people significantly increase fluid intake and we recommend meeting your bodies demands. This is a NORMAL response.   If you are achy and you can take tylenol, motrin or ibuprofen if needed we are unable to prescribed it but will not

## 2025-04-28 ENCOUNTER — TELEPHONE (OUTPATIENT)
Age: 25
End: 2025-04-28

## 2025-04-28 ENCOUNTER — EVALUATION (OUTPATIENT)
Age: 25
End: 2025-04-28
Payer: COMMERCIAL

## 2025-04-28 DIAGNOSIS — R10.84 GENERALIZED ABDOMINAL PAIN: Primary | ICD-10-CM

## 2025-04-28 DIAGNOSIS — M62.838 MUSCLE SPASM: ICD-10-CM

## 2025-04-28 DIAGNOSIS — R10.2 PELVIC PAIN: ICD-10-CM

## 2025-04-28 DIAGNOSIS — N94.10 DYSPAREUNIA, FEMALE: ICD-10-CM

## 2025-04-28 DIAGNOSIS — R35.0 URINARY FREQUENCY: ICD-10-CM

## 2025-04-28 DIAGNOSIS — K59.00 CONSTIPATION, UNSPECIFIED CONSTIPATION TYPE: ICD-10-CM

## 2025-04-28 DIAGNOSIS — R68.82 DECREASED LIBIDO: Primary | ICD-10-CM

## 2025-04-28 DIAGNOSIS — R39.15 URGENCY OF URINATION: ICD-10-CM

## 2025-04-28 PROCEDURE — 97140 MANUAL THERAPY 1/> REGIONS: CPT

## 2025-04-28 PROCEDURE — 97530 THERAPEUTIC ACTIVITIES: CPT

## 2025-04-28 NOTE — TELEPHONE ENCOUNTER
Peg from [facility] came over and asked if we could order a hormone panel d/t low libido. Patient states she did not have a low libido on birth control. Please advise?

## 2025-05-01 ENCOUNTER — HOSPITAL ENCOUNTER (OUTPATIENT)
Age: 25
Setting detail: SPECIMEN
Discharge: HOME | End: 2025-05-01

## 2025-05-01 DIAGNOSIS — R68.82 DECREASED LIBIDO: ICD-10-CM

## 2025-05-01 LAB
SHBG SERPL-SCNC: 125 NMOL/L (ref 25–122)
TESTOST FREE MFR SERPL: <1 PG/ML (ref 0.8–7.4)
TESTOST SERPL-MCNC: 11 NG/DL (ref 8–48)

## 2025-05-05 ENCOUNTER — EVALUATION (OUTPATIENT)
Age: 25
End: 2025-05-05
Payer: COMMERCIAL

## 2025-05-05 DIAGNOSIS — R10.84 GENERALIZED ABDOMINAL PAIN: Primary | ICD-10-CM

## 2025-05-05 DIAGNOSIS — R39.15 URGENCY OF URINATION: ICD-10-CM

## 2025-05-05 DIAGNOSIS — R35.0 URINARY FREQUENCY: ICD-10-CM

## 2025-05-05 DIAGNOSIS — K59.00 CONSTIPATION, UNSPECIFIED CONSTIPATION TYPE: ICD-10-CM

## 2025-05-05 DIAGNOSIS — R10.2 PELVIC PAIN: ICD-10-CM

## 2025-05-05 DIAGNOSIS — M62.838 MUSCLE SPASM: ICD-10-CM

## 2025-05-05 DIAGNOSIS — N94.10 DYSPAREUNIA, FEMALE: ICD-10-CM

## 2025-05-05 PROCEDURE — 97530 THERAPEUTIC ACTIVITIES: CPT

## 2025-05-05 PROCEDURE — 97140 MANUAL THERAPY 1/> REGIONS: CPT

## 2025-05-06 NOTE — PROGRESS NOTES
[facility] PHYSICIANS [facility] UROGYNECOLOGY AND PELVIC REHABILITATION  [address]  [address]  [facility] [address]  Dept: [phone]     Date of Visit: 2025   Patient: Korin Christie   : 2000   Referring Physician:  MODESTA Kruger*    Insurance: [facility]    Visit#:  8  Visit Diagnoses:      Visit Diagnoses         Codes      Generalized abdominal pain    -  Primary R10.84      Muscle spasm     M62.838      Pelvic pain     R10.2      Urgency of urination     R39.15      Urinary frequency     R35.0      Dyspareunia, female     N94.10      Constipation, unspecified constipation type     K59.00          Subjective:  Korin Christie  (: 2000  is a 25 y.o.  y.o. female ,.  Pt state she is voiding every 2-3 hours and not getting up at night at all.   Pt was on her cycle last week  did have an epsiode of passing out in the middle of the night when getting up to go to the bathroom and take her tampon out thinking maybe toxicity however did not have tampin in for long, fiance was with her she has been fine since.  Did get blood panel done and testosterone is off will follow up with Laura Pineda for POC for libido.      Objective:   Significant tightness and tenderness noted across upper and lower abdomen and bilateral LB/buttocks region     Tightness noted at the opening   Tightness and tenderness noted at bilateral 1-3rd layers with left greater than right  Tenderness and pain noted at bilateral OI    Educated on after care of cupping    Aftercare instructions:  any of these symptoms may last for 24-48 hours after treatment  No hot pack or cold pack for 6 hours anywhere on the body   If you are light headed and/or nauseas or get a headache. Drink more water or electrolyte solution (ex water diluted Gatorade, LMNT, liquid IV) as we drain everything into your lymphatic system which can caused increased frequency with urination or increased

## 2025-05-12 ENCOUNTER — EVALUATION (OUTPATIENT)
Age: 25
End: 2025-05-12
Payer: COMMERCIAL

## 2025-05-12 ENCOUNTER — OFFICE VISIT (OUTPATIENT)
Age: 25
End: 2025-05-12
Payer: COMMERCIAL

## 2025-05-12 VITALS — OXYGEN SATURATION: 98 % | DIASTOLIC BLOOD PRESSURE: 72 MMHG | SYSTOLIC BLOOD PRESSURE: 114 MMHG | HEART RATE: 100 BPM

## 2025-05-12 DIAGNOSIS — R39.15 URGENCY OF URINATION: ICD-10-CM

## 2025-05-12 DIAGNOSIS — R10.84 GENERALIZED ABDOMINAL PAIN: Primary | ICD-10-CM

## 2025-05-12 DIAGNOSIS — M62.838 MUSCLE SPASM: ICD-10-CM

## 2025-05-12 DIAGNOSIS — R10.2 PELVIC PAIN: ICD-10-CM

## 2025-05-12 DIAGNOSIS — R68.82 DECREASED LIBIDO: Primary | ICD-10-CM

## 2025-05-12 DIAGNOSIS — R39.89 BLADDER PAIN: ICD-10-CM

## 2025-05-12 DIAGNOSIS — K59.00 CONSTIPATION, UNSPECIFIED CONSTIPATION TYPE: ICD-10-CM

## 2025-05-12 DIAGNOSIS — R35.0 URINARY FREQUENCY: ICD-10-CM

## 2025-05-12 DIAGNOSIS — N94.6 DYSMENORRHEA: ICD-10-CM

## 2025-05-12 DIAGNOSIS — N94.10 DYSPAREUNIA, FEMALE: ICD-10-CM

## 2025-05-12 DIAGNOSIS — R53.82 CHRONIC FATIGUE: ICD-10-CM

## 2025-05-12 PROCEDURE — 97530 THERAPEUTIC ACTIVITIES: CPT

## 2025-05-12 PROCEDURE — G8419 CALC BMI OUT NRM PARAM NOF/U: HCPCS | Performed by: NURSE PRACTITIONER

## 2025-05-12 PROCEDURE — 97140 MANUAL THERAPY 1/> REGIONS: CPT

## 2025-05-12 PROCEDURE — 1036F TOBACCO NON-USER: CPT | Performed by: NURSE PRACTITIONER

## 2025-05-12 PROCEDURE — 99214 OFFICE O/P EST MOD 30 MIN: CPT | Performed by: NURSE PRACTITIONER

## 2025-05-12 PROCEDURE — G8427 DOCREV CUR MEDS BY ELIG CLIN: HCPCS | Performed by: NURSE PRACTITIONER

## 2025-05-12 RX ORDER — NAPROXEN SODIUM 550 MG/1
550 TABLET ORAL 2 TIMES DAILY WITH MEALS
Qty: 60 TABLET | Refills: 1 | Status: SHIPPED | OUTPATIENT
Start: 2025-05-12

## 2025-05-12 NOTE — PROGRESS NOTES
[facility] PHYSICIANS [facility], [facility] UROGYNECOLOGY AND PELVIC REHABILITATION  [address]  [address]  [facility] [place] [address]  Dept: [phone]     Date of Visit: 2025   Patient: Korin Christie   : 2000   Referring Physician:  MODESTA Kruger*    Insurance: [facility]    Visit#:  10  Visit Diagnoses:      Visit Diagnoses         Codes      Generalized abdominal pain    -  Primary R10.84      Muscle spasm     M62.838      Pelvic pain     R10.2      Urgency of urination     R39.15      Urinary frequency     R35.0      Dyspareunia, female     N94.10      Constipation, unspecified constipation type     K59.00                Subjective:  Korin Christie  (: 2000  is a 25 y.o.  y.o. female ,.  Pt state she is voiding every 2-3 hours and not getting up at night at all.   Pt did have intercourse 3 times last [place] it is better still some bleeding after and last night had shooting pain after kind of like when she has a cyst burst and then went away after awhile.    Did get blood panel done and testosterone is off will follow up with Laura Pineda today to review pt is getting  in july.      Objective:   Significant tightness and tenderness noted across upper and lower abdomen and bilateral LB/buttocks region     Educated on after care of cupping    Aftercare instructions:  any of these symptoms may last for 24-48 hours after treatment  No hot pack or cold pack for 6 hours anywhere on the body   If you are light headed and/or nauseas or get a headache. Drink more water or electrolyte solution (ex water diluted Gatorade, LMNT, liquid IV) as we drain everything into your lymphatic system which can caused increased frequency with urination or increased sweating. It is not uncommon to see people significantly increase fluid intake and we recommend meeting your bodies demands. This is a NORMAL response.   If you are achy and you can take tylenol, motrin or

## 2025-05-12 NOTE — PROGRESS NOTES
[facility], [facility] UROGYNECOLOGY AND PELVIC REHABILITATION   [address] [place] [address]  Dept: [phone]  Date: 5/12/2025  Patient Name: Korin Christie    VISIT - FOLLOW UP VISIT     CC: had concerns including Follow-up (Patient here to discuss labs ).    Chaperone present for entire visit and pertinent physical exam:None Required    HPI: Pt notes increase in libido with ovulation, free testosterone low normal, SHBG slightly elevated. Desires to have increased libido other times during the month, has not had in past.  Pt has chronic constipation, in recent months has alternating diarrhea. Pt reports chronic fatigue.Sleeps well, 6-7 hours daily. Feels tired upon awakening until she takes her ADHD medication. Then feels well until medication starts to wear off in the afternoon. She is on Lexapro for anxiety which improved per pt.  Notes when cycle starts and ends she notes dull bladder pain, no longer having urgency, has increased water and stopped gatorade, has decreased red bull.  Pt stopped Ocs 2022, periods heavier without Ocs, dysmenorrhea with or without Ocs. Condoms for contraception. Wedding July 2025. Mutually monogamous relationship.  Pt continuing with pelvic PT, dilation therapy    Overall state of well-being, dietary and nutritional habits, exercise routines of at least 30 minutes 3 times a week, bowel and bladder function, smoking history, HRT history, sexual activity and partner/s, dyspareunia, and immunizations all revisited if necessary by chart review or direct questioning.    Topics of Prolapse, Pain, Pressure were revisited including type, period of onset, level of severity, quality and quantity, associations, trends, exacerbators, alleviators, bleeding, prolapse to reduce, splinting, and prior treatment / surgery.    Topics of Urinary Leakage were revisited including type, period of onset, level of severity,

## 2025-05-12 NOTE — PATIENT INSTRUCTIONS
Sleep 8 hours per night  Daily walking/some form of physical activity  Diet discussed  1tbsp benefiber daily  Discuss longer acting ADHD medications (such as Azstarys) with PCP  MVI with D daily  Stop testosterone if planning pregnancy or if any chance of pregnancy

## 2025-05-20 ENCOUNTER — EVALUATION (OUTPATIENT)
Age: 25
End: 2025-05-20
Payer: COMMERCIAL

## 2025-05-20 DIAGNOSIS — M62.838 MUSCLE SPASM: ICD-10-CM

## 2025-05-20 DIAGNOSIS — K59.00 CONSTIPATION, UNSPECIFIED CONSTIPATION TYPE: ICD-10-CM

## 2025-05-20 DIAGNOSIS — R35.0 URINARY FREQUENCY: ICD-10-CM

## 2025-05-20 DIAGNOSIS — R10.84 GENERALIZED ABDOMINAL PAIN: Primary | ICD-10-CM

## 2025-05-20 DIAGNOSIS — R39.15 URGENCY OF URINATION: ICD-10-CM

## 2025-05-20 DIAGNOSIS — R10.2 PELVIC PAIN: ICD-10-CM

## 2025-05-20 DIAGNOSIS — N94.10 DYSPAREUNIA, FEMALE: ICD-10-CM

## 2025-05-20 PROCEDURE — 97530 THERAPEUTIC ACTIVITIES: CPT

## 2025-05-20 PROCEDURE — 97140 MANUAL THERAPY 1/> REGIONS: CPT

## 2025-05-20 NOTE — PROGRESS NOTES
[facility] PHYSICIANS [facility] UROGYNECOLOGY AND PELVIC REHABILITATION  [address]  [address]  [facility] [place] [address]  Dept: [phone]     Date of Visit: 2025   Patient: Korin Christie   : 2000   Referring Physician:  MODESTA Kruger*    Insurance: [facility]    Visit#:  11  Visit Diagnoses:        Visit Diagnoses         Codes      Generalized abdominal pain    -  Primary R10.84      Muscle spasm     M62.838      Pelvic pain     R10.2      Urgency of urination     R39.15      Urinary frequency     R35.0      Dyspareunia, female     N94.10      Constipation, unspecified constipation type     K59.00                    Subjective:  Korin Christie  (: 2000  is a 25 y.o.  y.o. female ,.  Pt state she is voiding every 2-3 hours and not getting up at night at all.   Pt did have intercourse this past week it is better still some bleeding after but if use dilators before hand it does not bleed as much.   Did get blood panel done and testosterone is off will be starting testosterone cream from [facility] and starting naproxen as well as increase fiber to regulate bowels.    Mention of ADHD medication to be changed pt will talk to PCP this week.      Objective:   Significant tightness and tenderness noted at bilateral LB/buttocks region   Tightness noted at vaginal opening  Tightness and tenderness noted at bilateral 1-2nd layers with left greater than right    Educated on after care of cupping    Aftercare instructions:  any of these symptoms may last for 24-48 hours after treatment  No hot pack or cold pack for 6 hours anywhere on the body   If you are light headed and/or nauseas or get a headache. Drink more water or electrolyte solution (ex water diluted Gatorade, LMNT, liquid IV) as we drain everything into your lymphatic system which can caused increased frequency with urination or increased sweating. It is not uncommon to see people

## 2025-05-27 ENCOUNTER — EVALUATION (OUTPATIENT)
Age: 25
End: 2025-05-27
Payer: COMMERCIAL

## 2025-05-27 DIAGNOSIS — R10.84 GENERALIZED ABDOMINAL PAIN: Primary | ICD-10-CM

## 2025-05-27 DIAGNOSIS — R35.0 URINARY FREQUENCY: ICD-10-CM

## 2025-05-27 DIAGNOSIS — N94.10 DYSPAREUNIA, FEMALE: ICD-10-CM

## 2025-05-27 DIAGNOSIS — K59.00 CONSTIPATION, UNSPECIFIED CONSTIPATION TYPE: ICD-10-CM

## 2025-05-27 DIAGNOSIS — R10.2 PELVIC PAIN: ICD-10-CM

## 2025-05-27 DIAGNOSIS — R39.15 URGENCY OF URINATION: ICD-10-CM

## 2025-05-27 DIAGNOSIS — M62.838 MUSCLE SPASM: ICD-10-CM

## 2025-05-27 PROCEDURE — 97530 THERAPEUTIC ACTIVITIES: CPT | Performed by: PHYSICAL THERAPIST

## 2025-05-27 NOTE — PROGRESS NOTES
Physical Therapy Treatment Note   [facility], [facility] UROGYNECOLOGY AND PELVIC REHABILITATION  [address]  [address]  [facility] [place] [address]  Dept: [phone]     Visit # 12  Discharge    Patient: Korin Christie  : 2000   DOS: 2025  Referring Physician:  MODESTA Kruger*     Time In: 0755  Time Out: 0833  Total Time (min): 38    Treatment:  Treatment Charges Mins Units   [] Manual Therapy (06125)     [x] Therapeutic Activity (96163) 38 3   [] Self Care/Home Management Training (97120)     [] Therapeutic Exercise (17638)     [] Neuromuscular Bandar (90180)     [] Gait Training (36919)     [] PT-Eval (69942, 61270, 61058)     [] PT Re-Eval (45141)     [] Caregiver Training (43418)     Total Treatment Time: 38 3     Subjective:  Pt's primary c/o:   Chief Complaint   Patient presents with    Pelvic Pain        Diagnosis:   Diagnosis Orders   1. Generalized abdominal pain        2. Muscle spasm        3. Pelvic pain        4. Dyspareunia, female        5. Urinary frequency        6. Urgency of urination        7. Constipation, unspecified constipation type           Visit Diagnoses         Codes      Generalized abdominal pain    -  Primary R10.84      Muscle spasm     M62.838      Pelvic pain     R10.2      Dyspareunia, female     N94.10      Urinary frequency     R35.0      Urgency of urination     R39.15      Constipation, unspecified constipation type     K59.00           Pt reports:  Doing well with her dilators.  She is asking if she should go up to the next size and she was instructed to do so gently.  She states she is not having hardly any pain with use of the dilator.  On her menstrual cycle today and declines cupping or review of dilator.  She feels that she is doing well with her belly breathing and hip exercises to help maintain her flexibility in her hips.  She feels pain is overall significantly less.  She is reporting being

## 2025-05-27 NOTE — PATIENT INSTRUCTIONS
Increase to larger size dilator, possibly try dilator interval recliner with 1 leg up on side of chair, continue belly breathing and hip opener exercises

## (undated) DEVICE — SYRINGE,PISTON,IRRIGATION,60ML,STERILE: Brand: MEDLINE

## (undated) DEVICE — SOLUTION SCRB 4OZ 7.5% POVIDONE IOD ANTIMIC BTL

## (undated) DEVICE — GLOVE ORANGE PI 7 1/2   MSG9075

## (undated) DEVICE — MHPB CYSTO PACK-LF: Brand: MEDLINE INDUSTRIES, INC.

## (undated) DEVICE — SOLUTION IV 1000ML 0.9% SOD CHL PH 5 INJ USP VIAFLX PLAS

## (undated) DEVICE — PREMIUM DRY TRAY LF: Brand: MEDLINE INDUSTRIES, INC.

## (undated) DEVICE — CATHETER,URETHRAL,REDRUBBER,STRL,12FR: Brand: MEDLINE INDUSTRIES, INC.

## (undated) DEVICE — SOLUTION PREP PAINT POV IOD FOR SKIN MUCOUS MEM

## (undated) DEVICE — DRAPE,UNDRBUT,WHT GRAD PCH,CAPPORT,20/CS: Brand: MEDLINE

## (undated) DEVICE — GOWN,SIRUS,NONRNF,SETINSLV,XL,20/CS: Brand: MEDLINE

## (undated) DEVICE — STRAP RESTRAIN W3.5XL19IN TECLIN STRRP POS LEG DURING LITH

## (undated) DEVICE — SOLUTION IRRIG 500ML 0.9% SOD CHLO USP POUR PLAS BTL